# Patient Record
Sex: MALE | Race: ASIAN | NOT HISPANIC OR LATINO | Employment: PART TIME | URBAN - METROPOLITAN AREA
[De-identification: names, ages, dates, MRNs, and addresses within clinical notes are randomized per-mention and may not be internally consistent; named-entity substitution may affect disease eponyms.]

---

## 2022-04-05 ENCOUNTER — HOSPITAL ENCOUNTER (EMERGENCY)
Facility: HOSPITAL | Age: 37
Discharge: HOME | End: 2022-04-05
Attending: EMERGENCY MEDICINE | Admitting: EMERGENCY MEDICINE
Payer: COMMERCIAL

## 2022-04-05 VITALS
DIASTOLIC BLOOD PRESSURE: 118 MMHG | SYSTOLIC BLOOD PRESSURE: 182 MMHG | TEMPERATURE: 98.8 F | OXYGEN SATURATION: 100 % | HEART RATE: 85 BPM | RESPIRATION RATE: 16 BRPM

## 2022-04-05 DIAGNOSIS — J06.9 URI (UPPER RESPIRATORY INFECTION): Primary | ICD-10-CM

## 2022-04-05 PROCEDURE — 99282 EMERGENCY DEPT VISIT SF MDM: CPT | Performed by: EMERGENCY MEDICINE

## 2022-04-05 PROCEDURE — 99282 EMERGENCY DEPT VISIT SF MDM: CPT

## 2022-04-05 RX ORDER — FLUTICASONE PROPIONATE 50 MCG
1 SPRAY, SUSPENSION (ML) NASAL DAILY
Qty: 16 G | Refills: 0 | Status: SHIPPED | OUTPATIENT
Start: 2022-04-05

## 2022-04-05 RX ORDER — IBUPROFEN 600 MG/1
600 TABLET ORAL EVERY 6 HOURS PRN
Qty: 30 TABLET | Refills: 0 | Status: SHIPPED | OUTPATIENT
Start: 2022-04-05

## 2022-04-05 NOTE — ED PROVIDER NOTES
History  Chief Complaint   Patient presents with    Nasal Congestion     Sinus congestion and sore throat that began yesterday    Sore Throat     Patient is a 70-year-old male no past medical history presenting with nasal congestion, sore throat  Patient states the last day he has had constant throbbing midline sore throat which is worse with swallowing but denies any dysphagia, shortness of breath, fevers  He notes nasal congestion in the same timeframe not relieved by Mucinex DM last night  Denies any fevers, chest pain, dizziness, nausea vomiting  Is vaccinated against flu and COVID  None       History reviewed  No pertinent past medical history  History reviewed  No pertinent surgical history  History reviewed  No pertinent family history  I have reviewed and agree with the history as documented  E-Cigarette/Vaping     E-Cigarette/Vaping Substances     Social History     Tobacco Use    Smoking status: Never Smoker    Smokeless tobacco: Never Used   Substance Use Topics    Alcohol use: Not Currently    Drug use: Never       Review of Systems   All other systems reviewed and are negative  Physical Exam  Physical Exam  Vitals reviewed  Constitutional:       General: He is not in acute distress  Appearance: Normal appearance  He is not ill-appearing  HENT:      Nose: Congestion present  No rhinorrhea  Comments: Bilaterally edematous nasal turbinates with no maxillary or frontal sinus tenderness     Mouth/Throat:      Mouth: Mucous membranes are moist       Pharynx: Uvula midline  Tonsils: No tonsillar exudate or tonsillar abscesses  0 on the right  0 on the left  Eyes:      Conjunctiva/sclera: Conjunctivae normal    Cardiovascular:      Rate and Rhythm: Normal rate  Pulmonary:      Effort: Pulmonary effort is normal       Breath sounds: Normal breath sounds  Abdominal:      General: Abdomen is flat  Palpations: Abdomen is soft  Tenderness:  There is no abdominal tenderness  Musculoskeletal:         General: No swelling  Normal range of motion  Cervical back: Neck supple  Lymphadenopathy:      Cervical: No cervical adenopathy  Skin:     General: Skin is warm and dry  Neurological:      General: No focal deficit present  Mental Status: He is alert  Psychiatric:         Mood and Affect: Mood normal          Vital Signs  ED Triage Vitals [04/05/22 0801]   Temperature Pulse Respirations Blood Pressure SpO2   98 8 °F (37 1 °C) 85 16 (!) 182/118 100 %      Temp Source Heart Rate Source Patient Position - Orthostatic VS BP Location FiO2 (%)   Oral Monitor Sitting Left arm --      Pain Score       --           Vitals:    04/05/22 0801   BP: (!) 182/118   Pulse: 85   Patient Position - Orthostatic VS: Sitting         Visual Acuity      ED Medications  Medications - No data to display    Diagnostic Studies  Results Reviewed     None                 No orders to display              Procedures  Procedures         ED Course                               SBIRT 20yo+      Most Recent Value   SBIRT (22 yo +)    In order to provide better care to our patients, we are screening all of our patients for alcohol and drug use  Would it be okay to ask you these screening questions? Yes Filed at: 04/05/2022 3438   Initial Alcohol Screen: US AUDIT-C     1  How often do you have a drink containing alcohol? 0 Filed at: 04/05/2022 0822   2  How many drinks containing alcohol do you have on a typical day you are drinking? 0 Filed at: 04/05/2022 0822   3a  Male UNDER 65: How often do you have five or more drinks on one occasion? 0 Filed at: 04/05/2022 0822   3b  FEMALE Any Age, or MALE 65+: How often do you have 4 or more drinks on one occassion? 0 Filed at: 04/05/2022 2910   Audit-C Score 0 Filed at: 04/05/2022 4369   EBER: How many times in the past year have you    Used an illegal drug or used a prescription medication for non-medical reasons?  Never Filed at: 04/05/2022 6601                    [facility]  Number of Diagnoses or Management Options  URI (upper respiratory infection)  Diagnosis management comments: Patient is a 75-year-old male no past medical history presenting with upper respiratory infection  Patient is well-appearing bedside stable vitals and in no acute distress  He has no fevers, no cervical lymphadenopathy, no exudates, no concern for strep  Have discussed testing for COVID/fluid patient has declined testing  Have advised over-the-counter treatments and patient states he understands  Disposition  Final diagnoses:   URI (upper respiratory infection)     Time reflects when diagnosis was documented in both MDM as applicable and the Disposition within this note     Time User Action Codes Description Comment    4/5/2022  8:24 AM Jignesh Angulo [J06 [id number]] URI (upper respiratory infection)       ED Disposition     ED Disposition Condition Date/Time Comment    Discharge Stable Tue Apr 5, 2022  8:24 AM Jennifer Falcon discharge to home/self care  Follow-up Information     Follow up With Specialties Details Why [address],  Internal Medicine Schedule an appointment as soon as possible for a visit   [address] [place] [address]  [phone]            Discharge Medication List as of 4/5/2022  8:25 AM      START taking these medications    Details   fluticasone (FLONASE) 50 mcg/act nasal spray 1 spray into each nostril daily, Starting Tue 4/5/2022, Print      ibuprofen (MOTRIN) 600 mg tablet Take 1 tablet (600 mg total) by mouth every 6 (six) hours as needed for mild pain, Starting Tue 4/5/2022, Print             No discharge procedures on file      PDMP Review     None          ED Provider  Electronically Signed by           Ori Hernandez DO  04/05/22 9816